# Patient Record
Sex: FEMALE | Race: WHITE
[De-identification: names, ages, dates, MRNs, and addresses within clinical notes are randomized per-mention and may not be internally consistent; named-entity substitution may affect disease eponyms.]

---

## 2021-05-12 ENCOUNTER — HOSPITAL ENCOUNTER (INPATIENT)
Dept: HOSPITAL 47 - FBPOP | Age: 21
LOS: 1 days | Discharge: HOME | End: 2021-05-13
Attending: OBSTETRICS & GYNECOLOGY | Admitting: OBSTETRICS & GYNECOLOGY
Payer: COMMERCIAL

## 2021-05-12 DIAGNOSIS — Z3A.40: ICD-10-CM

## 2021-05-12 LAB
BASOPHILS # BLD AUTO: 0 K/UL (ref 0–0.2)
BASOPHILS NFR BLD AUTO: 0 %
EOSINOPHIL # BLD AUTO: 0.2 K/UL (ref 0–0.7)
EOSINOPHIL NFR BLD AUTO: 1 %
ERYTHROCYTE [DISTWIDTH] IN BLOOD BY AUTOMATED COUNT: 4.06 M/UL (ref 3.8–5.4)
ERYTHROCYTE [DISTWIDTH] IN BLOOD: 14.1 % (ref 11.5–15.5)
HCT VFR BLD AUTO: 35 % (ref 34–46)
HGB BLD-MCNC: 11.8 GM/DL (ref 11.4–16)
LYMPHOCYTES # SPEC AUTO: 1.5 K/UL (ref 1–4.8)
LYMPHOCYTES NFR SPEC AUTO: 12 %
MCH RBC QN AUTO: 29.2 PG (ref 25–35)
MCHC RBC AUTO-ENTMCNC: 33.8 G/DL (ref 31–37)
MCV RBC AUTO: 86.2 FL (ref 80–100)
MONOCYTES # BLD AUTO: 0.5 K/UL (ref 0–1)
MONOCYTES NFR BLD AUTO: 4 %
NEUTROPHILS # BLD AUTO: 10.5 K/UL (ref 1.3–7.7)
NEUTROPHILS NFR BLD AUTO: 83 %
PLATELET # BLD AUTO: 156 K/UL (ref 150–450)
WBC # BLD AUTO: 12.8 K/UL (ref 3.8–10.6)

## 2021-05-12 PROCEDURE — 99213 OFFICE O/P EST LOW 20 MIN: CPT

## 2021-05-12 PROCEDURE — 59025 FETAL NON-STRESS TEST: CPT

## 2021-05-12 PROCEDURE — 0UQMXZZ REPAIR VULVA, EXTERNAL APPROACH: ICD-10-PCS

## 2021-05-12 PROCEDURE — 86901 BLOOD TYPING SEROLOGIC RH(D): CPT

## 2021-05-12 PROCEDURE — 85025 COMPLETE CBC W/AUTO DIFF WBC: CPT

## 2021-05-12 PROCEDURE — 86900 BLOOD TYPING SEROLOGIC ABO: CPT

## 2021-05-12 PROCEDURE — 86850 RBC ANTIBODY SCREEN: CPT

## 2021-05-12 RX ADMIN — POTASSIUM CHLORIDE SCH MLS/HR: 14.9 INJECTION, SOLUTION INTRAVENOUS at 12:46

## 2021-05-12 RX ADMIN — IBUPROFEN SCH MG: 600 TABLET ORAL at 15:58

## 2021-05-12 RX ADMIN — POTASSIUM CHLORIDE SCH MLS/HR: 14.9 INJECTION, SOLUTION INTRAVENOUS at 11:45

## 2021-05-13 VITALS — HEART RATE: 88 BPM | DIASTOLIC BLOOD PRESSURE: 55 MMHG | SYSTOLIC BLOOD PRESSURE: 101 MMHG | RESPIRATION RATE: 15 BRPM

## 2021-05-13 VITALS — TEMPERATURE: 98.1 F

## 2021-05-13 LAB
BASOPHILS # BLD AUTO: 0 K/UL (ref 0–0.2)
BASOPHILS NFR BLD AUTO: 0 %
EOSINOPHIL # BLD AUTO: 0.2 K/UL (ref 0–0.7)
EOSINOPHIL NFR BLD AUTO: 1 %
ERYTHROCYTE [DISTWIDTH] IN BLOOD BY AUTOMATED COUNT: 3.31 M/UL (ref 3.8–5.4)
ERYTHROCYTE [DISTWIDTH] IN BLOOD: 14.4 % (ref 11.5–15.5)
HCT VFR BLD AUTO: 28.8 % (ref 34–46)
HGB BLD-MCNC: 9.9 GM/DL (ref 11.4–16)
LYMPHOCYTES # SPEC AUTO: 2.5 K/UL (ref 1–4.8)
LYMPHOCYTES NFR SPEC AUTO: 22 %
MCH RBC QN AUTO: 30 PG (ref 25–35)
MCHC RBC AUTO-ENTMCNC: 34.5 G/DL (ref 31–37)
MCV RBC AUTO: 87 FL (ref 80–100)
MONOCYTES # BLD AUTO: 0.8 K/UL (ref 0–1)
MONOCYTES NFR BLD AUTO: 8 %
NEUTROPHILS # BLD AUTO: 7.5 K/UL (ref 1.3–7.7)
NEUTROPHILS NFR BLD AUTO: 67 %
PLATELET # BLD AUTO: 136 K/UL (ref 150–450)
WBC # BLD AUTO: 11.2 K/UL (ref 3.8–10.6)

## 2021-05-13 RX ADMIN — IBUPROFEN SCH: 600 TABLET ORAL at 04:40

## 2021-05-13 RX ADMIN — DOCUSATE SODIUM AND SENNOSIDES SCH EACH: 50; 8.6 TABLET ORAL at 07:41

## 2021-05-13 RX ADMIN — IBUPROFEN SCH MG: 600 TABLET ORAL at 11:03

## 2021-05-13 RX ADMIN — DOCUSATE SODIUM AND SENNOSIDES SCH: 50; 8.6 TABLET ORAL at 00:40

## 2021-05-13 RX ADMIN — IBUPROFEN SCH MG: 600 TABLET ORAL at 04:39

## 2021-05-13 NOTE — P.PROBDLV
Vaginal Delivery Note





- .


Vaginal Delivery Note: 





20-year-old  presented at 40 weeks and 4 days in active labor.  Her cervix 

was 4 cm dilated, her percent effaced, and -2 station.  She is kiki every

2-3 minutes.  Fetal heart tones 140 with moderate variability and reactive.  

Amniotomy was performed at 12:03 PM, clear fluid noted.  She then got an 

epidural.  Her cervix was completely dilated at 4 teen 44.  She pushed, and 

delivered a viable male infant over intact perineum under epidural anesthesia at

1506.  Head delivered OA, anterior shoulder delivered gentle downward guidance. 

A posterior shoulder and rest of body.  Nose and mouth bulb suctioned, cord To 

cut, infant placed mother's abdomen.  Apgars 7, 9, weight 7 lbs. 14 oz.  

Placenta delivered spontaneous, intact with three-vessel cord at 1508.  Vagina, 

cervix, perineum inspected.  Second-degree laceration of the left labia was 

repaired with 3-0 Vicryl.  Estimated blood loss 150 mL.  Mother and baby in 

stable condition.

## 2021-05-13 NOTE — P.DS
Providers


Date of admission: 


05/12/21 11:02





Expected date of discharge: 05/13/21


Attending physician: 


Aurora Conklin





Primary care physician: 


Stated None








- Discharge Diagnosis(es)


(1) Normal labor


Current Visit: Yes   Status: Resolved   





(2) Normal vaginal delivery


Current Visit: Yes   Status: Acute   


Hospital Course: 





Patient presented in active labor.  She underwent a normal vaginal delivery.  

Postpartum course uncomplicated.  She'll be discharged home postpartum day #1 in

stable condition to follow-up with me in 6 weeks.





Plan - Discharge Summary


New Discharge Prescriptions: 


New


   Ibuprofen [Motrin] 600 mg PO Q6H #40 tab





No Action


   Pnv No.95/Ferrous Fum/Folic AC [Prenatal Multivitamin Tablet] 1 each PO DAILY


Discharge Medication List





Pnv No.95/Ferrous Fum/Folic AC [Prenatal Multivitamin Tablet] 1 each PO DAILY 

05/12/21 [History]


Ibuprofen [Motrin] 600 mg PO Q6H #40 tab 05/13/21 [Rx]








Follow up Appointment(s)/Referral(s): 


Aurora Conklin DO [Doctor of Osteopathic Medicine] - 6 Weeks


Discharge Disposition: HOME SELF-CARE

## 2021-05-13 NOTE — P.HPOB
History of Present Illness


H&P Date: 21


Chief Complaint: Labor





20-year-old  presented at 40 weeks and 4 days in active labor.  Her cervix 

was 4 cm dilated, her percent effaced, and -2 station.  She is kiki every

2-3 minutes.  Fetal heart tones 140 with moderate variability and reactive.





Review of Systems


All systems: negative


Constitutional: Denies chills, Denies fever


Eyes: denies blurred vision, denies pain


Ears, nose, mouth and throat: Denies headache, Denies sore throat


Cardiovascular: Denies chest pain, Denies shortness of breath


Respiratory: Denies cough


Gastrointestinal: Denies abdominal pain, Denies diarrhea, Denies nausea, Denies 

vomiting


Genitourinary: Denies dysuria, Denies hematuria


Musculoskeletal: Denies myalgias


Integumentary: Denies pruritus, Denies rash


Neurological: Denies numbness, Denies weakness


Psychiatric: Denies anxiety, Denies depression


Endocrine: Denies fatigue, Denies weight change





Past Medical History


Past Medical History: Asthma


History of Any Multi-Drug Resistant Organisms: None Reported


Past Surgical History: No Surgical Hx Reported


Past Anesthesia/Blood Transfusion Reactions: No Reported Reaction


Past Psychological History: No Psychological Hx Reported, Depression


Smoking Status: Never smoker


Past Alcohol Use History: None Reported


Past Drug Use History: None Reported





- Past Family History


  ** Father


Family Medical History: Hypertension





Medications and Allergies


                                Home Medications











 Medication  Instructions  Recorded  Confirmed  Type


 


Pnv No.95/Ferrous Fum/Folic AC 1 each PO DAILY 21 History





[Prenatal Multivitamin Tablet]    








                                    Allergies











Allergy/AdvReac Type Severity Reaction Status Date / Time


 


No Known Allergies Allergy   Verified 21 10:56














Exam


Osteopathic Statement: *.  No significant issues noted on an osteopathic 

structural exam other than those noted in the History and Physical/Consult.


                                   Vital Signs











  Temp Pulse Resp BP Pulse Ox


 


 21 08:00  98.1 F  88  15  101/55  98


 


 21 00:00  98.1 F  95  18  109/69  95


 


 21 20:00  98.2 F  90  18  109/63  96


 


 21 17:15   93  17  114/64 


 


 21 16:45   93  17  121/70 


 


 21 16:15  98.7 F  96  18  123/81 


 


 21 16:00   88  17  119/68 


 


 21 15:45   100  18  116/78 


 


 21 15:30   103 H  18  124/74 


 


 21 15:15  98.3 F  101 H  18  124/71 


 


 21 10:57  97.0 F L  121 H  18  128/88 








                                Intake and Output











 21





 22:59 06:59 14:59


 


Intake Total 767.883  


 


Output Total 150  


 


Balance 617.883  


 


Intake:   


 


  Intake, IV Titration 167.883  





  Amount   


 


    Oxytocin 30 Units/500 ml 167.883  





    Ns 30 unit In Saline 1   





    500ml.bag @ Per Protocol   





    IV .Q0M URIEL Rx#:[id number]   


 


  Oral 600  


 


Output:   


 


  Estimated Blood Loss 150  


 


Other:   


 


  # Voids 1 1 














Heart: Regular rate and rhythm


Lungs: Clear to auscultation bilaterally


Abdomen: Soft, nontender


Extremities: Negative Homans sign





Results


Result Diagrams: 


                                 21 07:25





                  Abnormal Lab Results - Last 24 Hours (Table)











  21 Range/Units





  11:45 07:25 


 


WBC  12.8 H  11.2 H  (3.8-10.6)  k/uL


 


RBC   3.31 L  (3.80-5.40)  m/uL


 


Hgb   9.9 L D  (11.4-16.0)  gm/dL


 


Hct   28.8 L  (34.0-46.0)  %


 


Plt Count   136 L  (150-450)  k/uL


 


Neutrophils #  10.5 H   (1.3-7.7)  k/uL














Assessment and Plan


(1) Normal labor


Current Visit: Yes   Status: Acute   Code(s): O80 - ENCOUNTER FOR FULL-TERM 

UNCOMPLICATED DELIVERY; Z37.9 - OUTCOME OF DELIVERY, UNSPECIFIED   SNOMED 

Code(s): 65312723


   


Plan: 





1.  Admit to family birth place


2.  Expectant management


3.  Anticipate normal vaginal delivery